# Patient Record
Sex: FEMALE | Race: BLACK OR AFRICAN AMERICAN | Employment: STUDENT | URBAN - METROPOLITAN AREA
[De-identification: names, ages, dates, MRNs, and addresses within clinical notes are randomized per-mention and may not be internally consistent; named-entity substitution may affect disease eponyms.]

---
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
## 2024-01-04 ENCOUNTER — OFFICE VISIT (OUTPATIENT)
Age: 19
End: 2024-01-04

## 2024-01-04 VITALS — BODY MASS INDEX: 19.97 KG/M2 | WEIGHT: 120 LBS

## 2024-01-04 DIAGNOSIS — Z71.89 INJECTION EDUCATION, ENCOUNTER FOR: Primary | ICD-10-CM

## 2024-02-20 ENCOUNTER — OFFICE VISIT (OUTPATIENT)
Age: 19
End: 2024-02-20
Payer: COMMERCIAL

## 2024-02-20 VITALS
SYSTOLIC BLOOD PRESSURE: 108 MMHG | HEIGHT: 64 IN | BODY MASS INDEX: 21.41 KG/M2 | DIASTOLIC BLOOD PRESSURE: 70 MMHG | WEIGHT: 125.4 LBS

## 2024-02-20 DIAGNOSIS — Z11.3 SCREENING EXAMINATION FOR STD (SEXUALLY TRANSMITTED DISEASE): Primary | ICD-10-CM

## 2024-02-20 PROCEDURE — 99212 OFFICE O/P EST SF 10 MIN: CPT | Performed by: MIDWIFE

## 2024-02-20 NOTE — PROGRESS NOTES
Vinnie Menchaca is a 18 y.o. female who desires STI screening. She also reported spotting.    Her current method of family planning is Depo-Provera injections. The patient is sexually active.     She developed this problem approximately a few weeks ago.       Her relevant past medical history:   History reviewed. No pertinent past medical history.     No past surgical history on file.  Social History     Occupational History    Not on file   Tobacco Use    Smoking status: Never    Smokeless tobacco: Never   Vaping Use    Vaping Use: Never used   Substance and Sexual Activity    Alcohol use: No    Drug use: No    Sexual activity: Yes     Partners: Male     Birth control/protection: Injection     Family History   Problem Relation Age of Onset    COPD Maternal Grandmother     Hypertension Maternal Grandmother     Hypertension Mother        No Known Allergies  Prior to Admission medications    Medication Sig Start Date End Date Taking? Authorizing Provider   medroxyPROGESTERone (DEPO-PROVERA) 150 MG/ML injection Inject 1 mL into the muscle every 3 months 7/6/23  Yes Tomasa Lambert MD   acetaminophen (TYLENOL) 500 MG tablet Take 1 tablet by mouth every 6 hours as needed for Pain or Fever 11/30/23   Bety Floyd PA-C   ibuprofen (ADVIL;MOTRIN) 800 MG tablet Take 1 tablet by mouth every 6 hours as needed for Pain or Fever  Patient not taking: Reported on 2/20/2024 11/30/23   Bety Floyd PA-C   Dextromethorphan-guaiFENesin (ROBITUSSIN COUGH+CHEST VICKI DM) 5-100 MG/5ML LIQD Take 5 mLs by mouth every 6-8 hours as needed (cough, congestion)  Patient not taking: Reported on 1/4/2024 11/30/23   Bety Floyd PA-C   estradiol (ESTRACE) 0.5 MG tablet Take 1 tablet by mouth daily for 7 days 8/2/23 8/9/23  Justine Lockett MD        Review of Systems - History obtained from the patient  Constitutional: negative for weight loss, fever, night sweats  HEENT: negative for hearing loss, earache, congestion, snoring,

## 2024-02-21 LAB
HBV SURFACE AG SER QL: <0.1 INDEX
HBV SURFACE AG SER QL: NEGATIVE
HCV AB SER IA-ACNC: 0.04 INDEX
HCV AB SERPL QL IA: NONREACTIVE
HIV 1+2 AB+HIV1 P24 AG SERPL QL IA: NONREACTIVE
HIV 1/2 RESULT COMMENT: NORMAL

## 2024-02-22 LAB — T PALLIDUM AB SER QL IA: NON REACTIVE

## 2024-02-23 LAB — SPECIMEN STATUS REPORT: NORMAL

## 2024-02-24 LAB
A VAGINAE DNA VAG QL NAA+PROBE: ABNORMAL SCORE
BVAB2 DNA VAG QL NAA+PROBE: ABNORMAL SCORE
C ALBICANS DNA VAG QL NAA+PROBE: NEGATIVE
C GLABRATA DNA VAG QL NAA+PROBE: NEGATIVE
C TRACH DNA VAG QL NAA+PROBE: NEGATIVE
MEGA1 DNA VAG QL NAA+PROBE: ABNORMAL SCORE
N GONORRHOEA DNA VAG QL NAA+PROBE: NEGATIVE
T VAGINALIS DNA VAG QL NAA+PROBE: NEGATIVE

## 2024-02-26 RX ORDER — METRONIDAZOLE 500 MG/1
500 TABLET ORAL 3 TIMES DAILY
Qty: 14 TABLET | Refills: 0 | Status: SHIPPED | OUTPATIENT
Start: 2024-02-26 | End: 2024-03-04

## 2024-02-27 ENCOUNTER — TELEPHONE (OUTPATIENT)
Age: 19
End: 2024-02-27

## 2024-02-27 NOTE — TELEPHONE ENCOUNTER
Received a call from the pt's pharmacy requesting clarification on the flagyl script we just called in.    metroNIDAZOLE (FLAGYL) 500 MG tablet [[id number]]    Order Details  Dose: 500 mg Route: Oral Frequency: 3 TIMES DAILY   Dispense Quantity: 14 tablet Refills: 0          Sig: Take 1 tablet by mouth 3 times daily for 7 days         Start Date: 02/26/24 End Date: 03/04/24 after 21 doses   Written Date: 02/26/24 Expiration Date: 02/25/25

## 2024-04-02 ENCOUNTER — NURSE ONLY (OUTPATIENT)
Age: 19
End: 2024-04-02

## 2024-04-02 NOTE — PROGRESS NOTES
After obtaining consent, and per orders of Dominic Bowman CNM, injection of Depo given by Shira Corbin CMA.  Patient's name, , and injection type were verified with the patient today prior to giving the injection.      Medroxyprogesterone  : [employer]  Site: Left deltoid  Route: Intramuscular  Dose: 150mg/mL  Lot#: [id number]  Exp date: 2025  NDC: 06524-369-46

## 2024-06-07 NOTE — PROGRESS NOTES
Date last pap: pap NI due to age.  Last annual 04/18/23  Last Depo-Provera: 10/11/23  Side Effects if any: BTB on Depo  Serum HCG indicated? No, within window  Depo-Provera 150 mg IM given by: JOHNSON Maldonado.  Next appointment due 3/21-4/4     Chief Complaint   Patient presents with    Injections        Wt 54.4 kg (120 lb)   BMI 19.97 kg/m²          Patient/Caregiver provided printed discharge information.

## 2024-06-18 RX ORDER — MEDROXYPROGESTERONE ACETATE 150 MG/ML
150 INJECTION, SUSPENSION INTRAMUSCULAR
Qty: 1 ML | Refills: 3 | Status: SHIPPED | OUTPATIENT
Start: 2024-06-18

## 2024-06-18 NOTE — TELEPHONE ENCOUNTER
I have called and spoken to the pt to inquire if she were able to receive her injection. She has informed me that she has been at work all day and was unable to fill her script. She will call us back to get put back on the schedule. It looks as though the script was sent in and she should be able to pick this up from the new preferred pharmacy.

## 2024-08-10 ENCOUNTER — APPOINTMENT (OUTPATIENT)
Facility: HOSPITAL | Age: 19
End: 2024-08-10
Payer: COMMERCIAL

## 2024-08-10 ENCOUNTER — HOSPITAL ENCOUNTER (EMERGENCY)
Facility: HOSPITAL | Age: 19
Discharge: HOME | End: 2024-08-10
Payer: COMMERCIAL

## 2024-08-10 VITALS
HEIGHT: 65 IN | WEIGHT: 120 LBS | TEMPERATURE: 98 F | BODY MASS INDEX: 19.99 KG/M2 | DIASTOLIC BLOOD PRESSURE: 75 MMHG | HEART RATE: 77 BPM | RESPIRATION RATE: 18 BRPM | SYSTOLIC BLOOD PRESSURE: 117 MMHG | OXYGEN SATURATION: 99 %

## 2024-08-10 DIAGNOSIS — S92.354A CLOSED NONDISPLACED FRACTURE OF FIFTH METATARSAL BONE OF RIGHT FOOT, INITIAL ENCOUNTER: Primary | ICD-10-CM

## 2024-08-10 PROCEDURE — 99284 EMERGENCY DEPT VISIT MOD MDM: CPT

## 2024-08-10 PROCEDURE — 73630 X-RAY EXAM OF FOOT: CPT

## 2024-08-10 PROCEDURE — 96372 THER/PROPH/DIAG INJ SC/IM: CPT

## 2024-08-10 PROCEDURE — 6360000002 HC RX W HCPCS

## 2024-08-10 PROCEDURE — 29515 APPLICATION SHORT LEG SPLINT: CPT

## 2024-08-10 RX ORDER — METHOCARBAMOL 500 MG/1
500 TABLET, FILM COATED ORAL 4 TIMES DAILY PRN
Qty: 40 TABLET | Refills: 0 | Status: SHIPPED | OUTPATIENT
Start: 2024-08-10 | End: 2024-08-20

## 2024-08-10 RX ORDER — ACETAMINOPHEN 500 MG
500 TABLET ORAL EVERY 8 HOURS PRN
Qty: 30 TABLET | Refills: 0 | Status: SHIPPED | OUTPATIENT
Start: 2024-08-10

## 2024-08-10 RX ORDER — IBUPROFEN 800 MG/1
800 TABLET ORAL EVERY 6 HOURS PRN
Qty: 30 TABLET | Refills: 0 | Status: SHIPPED | OUTPATIENT
Start: 2024-08-10

## 2024-08-10 RX ORDER — KETOROLAC TROMETHAMINE 30 MG/ML
30 INJECTION, SOLUTION INTRAMUSCULAR; INTRAVENOUS ONCE
Status: COMPLETED | OUTPATIENT
Start: 2024-08-10 | End: 2024-08-10

## 2024-08-10 RX ADMIN — KETOROLAC TROMETHAMINE 30 MG: 30 INJECTION, SOLUTION INTRAMUSCULAR at 13:00

## 2024-08-10 ASSESSMENT — PAIN SCALES - GENERAL
PAINLEVEL_OUTOF10: 10
PAINLEVEL_OUTOF10: 5

## 2024-08-10 ASSESSMENT — PAIN DESCRIPTION - ORIENTATION
ORIENTATION: RIGHT
ORIENTATION: RIGHT

## 2024-08-10 ASSESSMENT — PAIN DESCRIPTION - DESCRIPTORS
DESCRIPTORS: ACHING;THROBBING
DESCRIPTORS: ACHING;SORE

## 2024-08-10 ASSESSMENT — PAIN DESCRIPTION - LOCATION
LOCATION: FOOT
LOCATION: ANKLE

## 2024-08-10 ASSESSMENT — PAIN - FUNCTIONAL ASSESSMENT: PAIN_FUNCTIONAL_ASSESSMENT: 0-10

## 2024-08-10 NOTE — DISCHARGE INSTRUCTIONS
EXAM: XR FOOT RIGHT (MIN 3 VIEWS)     INDICATION: after tripping and falling last night. Site of pain not specified.     COMPARISON: None.     FINDINGS: Three views of the right foot demonstrate a linear lucency faint  through the base of the right fifth metatarsal on all 3 views. Little overlying  soft tissue swelling.. The soft tissues are within normal limits.     IMPRESSION:  Possible nondisplaced fracture of the right fifth metatarsal base  with little overlying soft tissue swelling. Correlate with mechanism of injury  and site of tenderness..

## 2024-08-10 NOTE — ED PROVIDER NOTES
[facility] EMERGENCY DEPT  EMERGENCY DEPARTMENT ENCOUNTER       Pt Name: Vinnie Menchaca  MRN: [id number]  Birthdate 2005  Date of evaluation: 8/10/2024  Provider: ANDREY Cisneros - JOSE C   PCP: Luan Gutiérrez Jr., MD  Note Started:  12:46 PM EDT 8/10/24     CHIEF COMPLAINT       Chief Complaint   Patient presents with    Foot Pain        HISTORY OF PRESENT ILLNESS: 1 or more elements      History From: Patient and Patient's Mother  HPI Limitations: None     Vinnie Menchaca is a 18 y.o. female who presents complaining of right foot pain on the lateral aspect of the foot x 1 day.  Patient states that she was play fighting and tripped and fell, has not been able to bear weight since then.  Patient states the pain is worse with palpation and movement of the foot, it is relieved with rest.  Patient states pain is 0 in the ED while her foot is elevated.  She denies paresthesia.  Denies prior injury.  Denies any other injury.     Nursing Notes were all reviewed and agreed with or any disagreements were addressed in the HPI.     REVIEW OF SYSTEMS      Review of Systems     Positives and Pertinent negatives as per HPI.    PAST HISTORY     Past Medical History:  No past medical history on file.    Past Surgical History:  No past surgical history on file.    Family History:  Family History   Problem Relation Age of Onset    COPD Maternal Grandmother     Hypertension Maternal Grandmother     Hypertension Mother        Social History:  Social History     Tobacco Use    Smoking status: Never    Smokeless tobacco: Never   Vaping Use    Vaping status: Never Used   Substance Use Topics    Alcohol use: No    Drug use: No       Allergies:  No Known Allergies    CURRENT MEDICATIONS      Previous Medications    DEXTROMETHORPHAN-GUAIFENESIN (ROBITUSSIN COUGH+CHEST VICKI DM) 5-100 MG/5ML LIQD    Take 5 mLs by mouth every 6-8 hours as needed (cough, congestion)    ESTRADIOL (ESTRACE) 0.5 MG TABLET    Take 1 tablet by mouth daily for 7  this time.    Shared decision-making performed care plan created together, discussed results, diagnosis, and treatment plan.  Signs of infection requiring emergent return to the ER include fever, a red, hot, swollen joint. Inability to bend/straighten the knee. Inability to bear weight or walk. Will place on short course steroid taper, discussed additional symptomatic management techniques such as NSAIDs and diet/lifestyle modifications.  Orthopedic follow-up.  PCP follow-up.  Verbal return precautions advised.  Patient is happy with this plan and verbalizes understanding and agreement.   ED Course as of 08/10/24 1322   Sat Aug 10, 2024   1321 I personally supervised and inspected the short leg splint placed by nursing. The right foot is appropriately immobilized. Patient neurovascularly intact before and after the application.    [RT]      ED Course User Index  [RT] Kaye Varner APRN - JOSE C       Disposition Considerations (Tests not done, Shared Decision Making, Pt Expectation of Test or Tx.): As above***     FINAL IMPRESSION     1. Closed nondisplaced fracture of fifth metatarsal bone of right foot, initial encounter          DISPOSITION/PLAN   DISPOSITION Discharge - Pending Orders Complete 08/10/2024 12:57:03 PM      {Disposition (Optional):59745}     PATIENT REFERRED TO:  Michael Khanna, DO  [address]  [address]  [facility] [id number] [phone]    In 3 days      [facility]  [address]  [address]  [facility] [address] [phone]  In 3 days      u Department Of Orthopaedic  [address] [address] [phone]  In 3 days      [facility] EMERGENCY DEPT  [address] [place] [address] [phone]    If symptoms worsen       DISCHARGE MEDICATIONS:     Medication List        START taking these medications      methocarbamol 500 MG tablet  Commonly known as: ROBAXIN  Take 1 tablet by mouth 4 times daily as needed (Muscle spasms)         [id number]  [phone]  In 3 days      [facility] Department Of Orthopaedic  [address] [facility] [address]  [phone]  In 3 days      [facility] EMERGENCY DEPT  [address] [facility] [address]  [phone]    If symptoms worsen       DISCHARGE MEDICATIONS:     Medication List        START taking these medications      methocarbamol 500 MG tablet  Commonly known as: ROBAXIN  Take 1 tablet by mouth 4 times daily as needed (Muscle spasms)            CHANGE how you take these medications      acetaminophen 500 MG tablet  Commonly known as: TYLENOL  Take 1 tablet by mouth every 8 hours as needed for Pain or Fever  What changed: when to take this            CONTINUE taking these medications      ibuprofen 800 MG tablet  Commonly known as: ADVIL;MOTRIN  Take 1 tablet by mouth every 6 hours as needed for Pain or Fever            ASK your doctor about these medications      estradiol 0.5 MG tablet  Commonly known as: ESTRACE  Take 1 tablet by mouth daily for 7 days     medroxyPROGESTERone 150 MG/ML injection  Commonly known as: Depo-Provera  Inject 1 mL into the muscle every 3 months     Robitussin Cough+Chest Nigel DM  MG/20ML Liqd liquid  Generic drug: Dextromethorphan-guaiFENesin  Take 5 mLs by mouth every 6-8 hours as needed (cough, congestion)               Where to Get Your Medications        These medications were sent to [id number] - [place], [place] - [address] [place] [address] - P [phone] - F [phone]  [address] [address]      Phone: [phone]   acetaminophen 500 MG tablet  ibuprofen 800 MG tablet  methocarbamol 500 MG tablet           DISCONTINUED MEDICATIONS:  Current Discharge Medication List          I have seen and evaluated the patient autonomously. My supervision physician was on site and available for consultation if needed.     I am the Primary Clinician of Record.   Kaye Varner, APRN - CNP (electronically signed)    (Please note that parts of this

## 2024-08-10 NOTE — ED NOTES
Pt presents to ED complaining of play fight and injured her right foot last night. Pt describes as sharp and throbbing with pain score of  6/ 10.  Pt is alert and oriented x 4, RR even and unlabored, skin is warm and dry. Assesment completed and pt updated on plan of care.       Emergency Department Nursing Plan of Care       The Nursing Plan of Care is developed from the Nursing assessment and Emergency Department Attending provider initial evaluation.  The plan of care may be reviewed in the “ED Provider note”.    The Plan of Care was developed with the following considerations:   Presenting ambulatory assessment: Ambulating at baseline  Patient / Family readiness to learn indicated by: verbalized understanding  Persons(s) to be included in education: patient   Barriers to Learning/Limitations: None    Signed     JOEL DESAI RN    8/10/2024   12:12 PM    normal S1, S2 heard

## 2024-08-10 NOTE — ED NOTES
Discharge instructions were given to the patient by JOEL DESAI RN.     The patient left the Emergency Department alert and oriented and in no acute distress with 3 prescriptions. The patient was encouraged to call or return to the ED for worsening issues or problems and was encouraged to schedule a follow up appointment for continuing care.     Ambulation assessment completed before discharge.  Pt left Emergency Department ambulating at baseline with crutches and splint  Ortho device education: proper application, proper use, and RICE    The patient verbalized understanding of discharge instructions and prescriptions, all questions were answered. The patient has no further concerns at this time.

## 2024-08-12 ENCOUNTER — TELEPHONE (OUTPATIENT)
Age: 19
End: 2024-08-12

## 2024-08-13 ENCOUNTER — OFFICE VISIT (OUTPATIENT)
Age: 19
End: 2024-08-13
Payer: COMMERCIAL

## 2024-08-13 ENCOUNTER — TELEPHONE (OUTPATIENT)
Age: 19
End: 2024-08-13

## 2024-08-13 VITALS — BODY MASS INDEX: 19.99 KG/M2 | HEIGHT: 65 IN | WEIGHT: 120 LBS

## 2024-08-13 DIAGNOSIS — S92.351A CLOSED FRACTURE OF BASE OF FIFTH METATARSAL BONE OF RIGHT FOOT: Primary | ICD-10-CM

## 2024-08-13 PROCEDURE — 99203 OFFICE O/P NEW LOW 30 MIN: CPT | Performed by: ORTHOPAEDIC SURGERY

## 2024-08-13 SDOH — HEALTH STABILITY: PHYSICAL HEALTH: ON AVERAGE, HOW MANY DAYS PER WEEK DO YOU ENGAGE IN MODERATE TO STRENUOUS EXERCISE (LIKE A BRISK WALK)?: 3 DAYS

## 2024-08-13 ASSESSMENT — PATIENT HEALTH QUESTIONNAIRE - PHQ9
SUM OF ALL RESPONSES TO PHQ QUESTIONS 1-9: 0
SUM OF ALL RESPONSES TO PHQ QUESTIONS 1-9: 0
SUM OF ALL RESPONSES TO PHQ9 QUESTIONS 1 & 2: 0
1. LITTLE INTEREST OR PLEASURE IN DOING THINGS: NOT AT ALL
SUM OF ALL RESPONSES TO PHQ QUESTIONS 1-9: 0
2. FEELING DOWN, DEPRESSED OR HOPELESS: NOT AT ALL
SUM OF ALL RESPONSES TO PHQ QUESTIONS 1-9: 0

## 2024-08-13 NOTE — PROGRESS NOTES
Identified pt with two pt identifiers (name and ). Reviewed chart in preparation for visit and have obtained necessary documentation.    Vinnie Menchaca is a 18 y.o. female Foot Injury (NP ED f/u RT 5th metacarpal fx )  .    Vitals:    24 1550   Weight: 54.4 kg (120 lb)   Height: 1.651 m (5' 5\")          1. Have you been to the ER, urgent care clinic since your last visit?  Hospitalized since your last visit?  yes - 08/10/2024 fx of 5th metatarsal      2. Have you seen or consulted any other health care providers outside of the [facility] since your last visit?  Include any pap smears or colon screening.  no

## 2024-08-13 NOTE — TELEPHONE ENCOUNTER
Patient was seen in office today for  Ed Follow up. Patient requested our office to fill out Disability forms out and fax back to [email or web address]. Patient signed a release of information this is Scand in patient chart. Patient will drop off forms when she receives them.

## 2024-08-14 NOTE — PROGRESS NOTES
8/14/2024      CC: left  foot pain    HPI:      This is a 18 y.o. year old female who fell 7 days ago.  The patient had the immediate onset of pain in the lateral aspect of the foot.  This causes difficulty with ambulation.  No open wounds that communicate with the deep tissues.  Treatment so far has consisted of: a splint.      PMH:  No past medical history on file.    PSxHx:  No past surgical history on file.    Meds:    Current Outpatient Medications:     ibuprofen (ADVIL;MOTRIN) 800 MG tablet, Take 1 tablet by mouth every 6 hours as needed for Pain or Fever, Disp: 30 tablet, Rfl: 0    methocarbamol (ROBAXIN) 500 MG tablet, Take 1 tablet by mouth 4 times daily as needed (Muscle spasms), Disp: 40 tablet, Rfl: 0    acetaminophen (TYLENOL) 500 MG tablet, Take 1 tablet by mouth every 8 hours as needed for Pain or Fever, Disp: 30 tablet, Rfl: 0    medroxyPROGESTERone (DEPO-PROVERA) 150 MG/ML injection, Inject 1 mL into the muscle every 3 months, Disp: 1 mL, Rfl: 3    Dextromethorphan-guaiFENesin (ROBITUSSIN COUGH+CHEST VICKI DM) 5-100 MG/5ML LIQD, Take 5 mLs by mouth every 6-8 hours as needed (cough, congestion) (Patient not taking: Reported on 1/4/2024), Disp: 118 mL, Rfl: 0    estradiol (ESTRACE) 0.5 MG tablet, Take 1 tablet by mouth daily for 7 days, Disp: 7 tablet, Rfl: 0    All:  No Known Allergies    Social Hx:  Social History     Socioeconomic History    Marital status: Single     Spouse name: None    Number of children: None    Years of education: None    Highest education level: None   Tobacco Use    Smoking status: Never    Smokeless tobacco: Never   Vaping Use    Vaping status: Never Used   Substance and Sexual Activity    Alcohol use: No    Drug use: No    Sexual activity: Yes     Partners: Male     Birth control/protection: Injection     Social Determinants of Health     Physical Activity: Unknown (8/13/2024)    Exercise Vital Sign     Days of Exercise per Week: 3 days       Family Hx:  Family History

## 2024-08-15 ENCOUNTER — TELEPHONE (OUTPATIENT)
Age: 19
End: 2024-08-15

## 2024-08-15 RX ORDER — IBUPROFEN 600 MG/1
600 TABLET ORAL
Qty: 30 TABLET | Refills: 0 | Status: SHIPPED | OUTPATIENT
Start: 2024-08-15 | End: 2024-08-25

## 2024-08-15 NOTE — TELEPHONE ENCOUNTER
Patient's mother called in stating that they were told by Dr. Khanna on visit 08/13/2024 that he would be prescribing pain medication and that it has still not been sent to the pharmacy. Advised per Dr. Khanna's office note, he did state pain control would be via OTC medications. Mother stated that is not what she was previously told, it has not been helping control the patient's pain and they are requesting a call back with next steps at [phone].

## 2025-02-19 ENCOUNTER — PATIENT MESSAGE (OUTPATIENT)
Age: 20
End: 2025-02-19